# Patient Record
Sex: MALE | NOT HISPANIC OR LATINO | URBAN - METROPOLITAN AREA
[De-identification: names, ages, dates, MRNs, and addresses within clinical notes are randomized per-mention and may not be internally consistent; named-entity substitution may affect disease eponyms.]

---

## 2021-12-18 ENCOUNTER — EMERGENCY (EMERGENCY)
Facility: HOSPITAL | Age: 21
LOS: 1 days | Discharge: HOME | End: 2021-12-18
Attending: EMERGENCY MEDICINE | Admitting: EMERGENCY MEDICINE
Payer: COMMERCIAL

## 2021-12-18 VITALS
TEMPERATURE: 98 F | SYSTOLIC BLOOD PRESSURE: 136 MMHG | WEIGHT: 125 LBS | RESPIRATION RATE: 18 BRPM | DIASTOLIC BLOOD PRESSURE: 88 MMHG | OXYGEN SATURATION: 100 % | HEIGHT: 66 IN | HEART RATE: 70 BPM

## 2021-12-18 PROCEDURE — 99283 EMERGENCY DEPT VISIT LOW MDM: CPT

## 2021-12-19 PROCEDURE — 10060 I&D ABSCESS SIMPLE/SINGLE: CPT

## 2021-12-19 PROCEDURE — 99283 EMERGENCY DEPT VISIT LOW MDM: CPT | Mod: 25

## 2021-12-19 RX ORDER — IBUPROFEN 200 MG
1 TABLET ORAL
Qty: 20 | Refills: 0
Start: 2021-12-19 | End: 2021-12-23

## 2021-12-19 RX ORDER — IBUPROFEN 200 MG
600 TABLET ORAL ONCE
Refills: 0 | Status: COMPLETED | OUTPATIENT
Start: 2021-12-19 | End: 2021-12-19

## 2021-12-19 RX ADMIN — Medication 600 MILLIGRAM(S): at 00:24

## 2021-12-19 RX ADMIN — Medication 1 TABLET(S): at 00:24

## 2021-12-19 NOTE — ED PROVIDER NOTE - CLINICAL SUMMARY MEDICAL DECISION MAKING FREE TEXT BOX
pt had small abscess on suprapubic area likely from infected hair follicle , needle aspiration done, no drainage , advised warm compress and oral abx

## 2021-12-19 NOTE — ED PROVIDER NOTE - PHYSICAL EXAMINATION
General:     NAD, well-nourished, well-appearing  Head:     NC/AT, EOMI, oral mucosa moist  Neck:     supple  Lungs:     CTA b/l, no w/r/r  CVS:     S1S2, RRR, no m/g/r  Abd:     +BS, s/nt/nd, no organomegaly  Ext:    2+ radial and pedal pulses, no c/c/e  Neuro: grossly intact  skin : indurated , erythematous , firm , abscess on suprapubic area

## 2021-12-19 NOTE — ED PROVIDER NOTE - PATIENT PORTAL LINK FT
You can access the FollowMyHealth Patient Portal offered by [facility] by registering at the following website: [email or web address]. By joining [employer]’s FollowMyHealth portal, you will also be able to view your health information using other applications (apps) compatible with our system.

## 2024-08-13 NOTE — ED ADULT NURSE NOTE - PAIN RATING/NUMBER SCALE (0-10): REST
eMERGENCY dEPARTMENT eNCOUnter      CHIEF COMPLAINT    Chief Complaint   Patient presents with    Dizziness    Headache New Onset on New Symptom       HPI    Scott Ortiz is a 10 year old male who presents to the ED with their family with complaint of dizziness and headache.  Patient was running when dropped off a wall practice when he became dizzy and had a headache.  Mother states that he had come right over and that she felt that it was lasting \"too long\" prompting her to bring him in.  Patient had no recent head trauma as practice had not officially started and they had just been doing drills.  Patient states that he is now feeling well.  Denies any changes in vision, headache, dizziness, chest pain, difficulty breathing, nausea, vomiting.  No history of cardiac or pulmonary pathologies.  Patient did have a concussion last year, however has not had any concussions or hits to his head recently.    Pediatrician: Kaylin; DEYANIRA on immunizations    ALLERGIES    ALLERGIES:  No Known Allergies    CURRENT MEDICATIONS    No current facility-administered medications for this encounter.     Current Outpatient Medications   Medication Sig Dispense Refill    albuterol 108 (90 Base) MCG/ACT inhaler Inhale 4 puffs into the lungs every 4 hours as needed.      ondansetron (ZOFRAN ODT) 4 MG disintegrating tablet Place 1 tablet onto the tongue every 6 hours as needed for Nausea. 10 tablet 0    Pediatric Multiple Vitamins (MULTIVITAMIN CHILDRENS PO)       cetirizine (ZyrTEC) 5 MG chewable tablet 1 tab 1x daily for allergy symptoms      fluticasone (FLONASE) 50 MCG/ACT nasal spray 1 spray into each nostril 1x daily for allergy symptoms.         PAST MEDICAL HISTORY    History reviewed. No pertinent past medical history.    SURGICAL HISTORY    History reviewed. No pertinent surgical history.    SOCIAL HISTORY         FAMILY HISTORY    History reviewed. No pertinent family history.    REVIEW OF SYSTEMS    HENT:  Denies  headaches  Respiratory: Denies shortness of breath or wheezing.  GI:  Denies vomiting or nausea  Neurology: denies current dizziness      PHYSICAL EXAM    Vitals:    08/12/24 1844   BP: 111/65   Pulse: 88   Resp: 20   Temp: 98.4 °F (36.9 °C)   TempSrc: Oral   SpO2: 100%   Weight: 45.3 kg (99 lb 13.9 oz)       Constitutional:  Well developed, well nourished. No acute distress, non-toxic appearance.    Eyes:  PERRL, conjunctivae normal.   HENT:  Head is atraumatic. External ears without lesions.  Neck: Normal range of motion.   Respiratory:  No respiratory distress, normal breath sounds. No rales. No wheezing.   Cardiovascular:  Normal rate, normal rhythm. No murmurs, gallops, or rubs.  Neurologic:  Acts developmentally appropriate for age. CN II-XII intact. Sensation intact in face and upper and lower extremities. 5/5 strength in upper and lower extremities.       RADIOLOGY    No orders to display         LABS    No results found for this visit on 08/12/24.      ED MEDICATIONS      ED Medication Orders (From admission, onward)      None              ED COURSE & MEDICAL DECISION MAKING    10-year-old male presenting due to dizziness and headache that have resolved prior to arrival at the emergency department.  Patient is in no acute distress, nontoxic-appearing.  Vital signs are unremarkable.  Suspicion for dehydration versus presyncope versus vasovagal reaction.    Patient is back to his baseline and as patient has not had any recent trauma and has a normal exam, discussed with mother not performing further workup at this time.  Mother understands and agrees with this.    Discussed warning signs and reasons to bring patient back to emergency department.  Recommended following up with primary care.    I discussed the possible diagnoses with the patient's family as well as the warning signs and symptoms. The patient's family understands that this is a provisional diagnosis based on current symptoms which can and do  change. If any new symptoms occur or if symptoms worsen, the patient's family will seek immediate attention for re-evaluation. Patient's family was also provided with discharge instructions and follow up information. Patient's family understands and agrees to treatment plan.          Impression:  The primary encounter diagnosis was Dizziness. A diagnosis of Acute nonintractable headache, unspecified headache type was also pertinent to this visit.    Follow Up:  Krissy Ewing MD  [address] [place] [address]  [phone]    Schedule an appointment as soon as possible for a visit in 2 days  As needed       New Prescriptions    No medications on file       Pt is discharged in stable condition.    Attending physician: Dr. Goncalves  Physician Assistant: ALDA Pat Gabrielle, PA-C  08/12/24 7882     8